# Patient Record
Sex: MALE | Race: WHITE | URBAN - METROPOLITAN AREA
[De-identification: names, ages, dates, MRNs, and addresses within clinical notes are randomized per-mention and may not be internally consistent; named-entity substitution may affect disease eponyms.]

---

## 2022-03-17 ENCOUNTER — OFFICE VISIT (OUTPATIENT)
Dept: URBAN - METROPOLITAN AREA CLINIC 24 | Facility: CLINIC | Age: 77
End: 2022-03-17

## 2022-03-17 DIAGNOSIS — H35.3211 EXDTVE AGE-REL MCLR DEGN, RIGHT EYE, WITH ACTV CHRDL NEOVAS: Primary | ICD-10-CM

## 2022-03-17 DIAGNOSIS — H25.12 AGE-RELATED NUCLEAR CATARACT, LEFT EYE: ICD-10-CM

## 2022-03-17 PROCEDURE — 92134 CPTRZ OPH DX IMG PST SGM RTA: CPT | Performed by: OPHTHALMOLOGY

## 2022-03-17 PROCEDURE — 67028 INJECTION EYE DRUG: CPT | Performed by: OPHTHALMOLOGY

## 2022-03-17 PROCEDURE — 99204 OFFICE O/P NEW MOD 45 MIN: CPT | Performed by: OPHTHALMOLOGY

## 2022-03-17 ASSESSMENT — INTRAOCULAR PRESSURE
OD: 19
OS: 18

## 2022-03-17 NOTE — IMPRESSION/PLAN
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  Plan: PCIOL OD -- [facility]    FUTURE may consider SUSVIMO or ASCENT Gene therapy option in pseudophakic pt w sliver SRF recur w injx in [place]

## 2022-03-17 NOTE — IMPRESSION/PLAN
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 Future ASCENT study or PDS? Plan: Prior tx x APRIL '21 at [facility] (see other notes). Mar '22 seen [facility] AGREE -- Needs ongoing injx for WET AMD -- 
  TODAY Avastin OD inj (proc note). ONGOING injx unless SUSVIMO -- Future ND? RTC 6-7w ([place] or [place]) and plan HRA baseline and Avastin OD injx SUSVIMO or ASCENT Gene therapy option in pseudophakic